# Patient Record
Sex: FEMALE | Race: WHITE | Employment: FULL TIME | URBAN - METROPOLITAN AREA
[De-identification: names, ages, dates, MRNs, and addresses within clinical notes are randomized per-mention and may not be internally consistent; named-entity substitution may affect disease eponyms.]

---

## 2017-07-18 ENCOUNTER — OFFICE VISIT (OUTPATIENT)
Dept: FAMILY MEDICINE | Facility: CLINIC | Age: 32
End: 2017-07-18
Payer: COMMERCIAL

## 2017-07-18 VITALS
SYSTOLIC BLOOD PRESSURE: 116 MMHG | HEART RATE: 81 BPM | RESPIRATION RATE: 16 BRPM | WEIGHT: 129.8 LBS | DIASTOLIC BLOOD PRESSURE: 78 MMHG | OXYGEN SATURATION: 98 % | TEMPERATURE: 98.5 F | BODY MASS INDEX: 22.28 KG/M2

## 2017-07-18 DIAGNOSIS — R68.84 JAW PAIN: Primary | ICD-10-CM

## 2017-07-18 PROCEDURE — 99213 OFFICE O/P EST LOW 20 MIN: CPT | Performed by: NURSE PRACTITIONER

## 2017-07-18 NOTE — MR AVS SNAPSHOT
After Visit Summary   7/18/2017    Mariola March    MRN: [id number]           Patient Information     Date Of Birth          1985        Visit Information        Provider Department      7/18/2017 10:00 AM Layla Richards APRN CNP [facility]        Today's Diagnoses     Jaw pain, left    -  1       Follow-ups after your visit        Your next 10 appointments already scheduled     Jul 27, 2017  8:00 AM CDT   [facility] Physical Adult with Iliana Arteaga MD   [facility] ([facility])    [address] [place] [id number] [phone]              Who to contact     If you have questions or need follow up information about today's clinic visit or your schedule please contact [facility] directly at [phone].  Normal or non-critical lab and imaging results will be communicated to you by MyChart, letter or phone within 4 business days after the clinic has received the results. If you do not hear from us within 7 days, please contact the clinic through Multigighart or phone. If you have a critical or abnormal lab result, we will notify you by phone as soon as possible.  Submit refill requests through [employer] or call your pharmacy and they will forward the refill request to us. Please allow 3 business days for your refill to be completed.          Additional Information About Your Visit        MyChart Information     [employer] gives you secure access to your electronic health record. If you see a primary care provider, you can also send messages to your care team and make appointments. If you have questions, please call your primary care clinic.  If you do not have a primary care provider, please call [phone] and they will assist you.        Care EveryWhere ID     This is your Care EveryWhere ID. This could be used by other organizations to access your [facility] medical records  [id number]         Your Vitals Were     Pulse Temperature Respirations Last Period Pulse Oximetry BMI (Body Mass Index)    81 98.5  F (36.9  C) (Oral) 16 2017 (Approximate) 98% 22.28 kg/m2       Blood Pressure from Last 3 Encounters:   17 116/78   16 120/80   16 109/74    Weight from Last 3 Encounters:   17 129 lb 12.8 oz (58.9 kg)   16 132 lb 9.6 oz (60.1 kg)   16 131 lb 8 oz (59.6 kg)              Today, you had the following     No orders found for display       Primary Care Provider    Physician No Ref-Primary       No address on file        Equal Access to Services     ARGENIS LAZAR : Shira Bettencourt, trish chi, jeff lowery, alex mendoza . So [facility] [phone].    ATENCIÓN: Si habla español, tiene a malin disposición servicios gratuitos de asistencia lingüística. Llame al [phone].    We comply with applicable federal civil rights laws and [place] laws. We do not discriminate on the basis of race, color, national origin, age, disability sex, sexual orientation or gender identity.            Thank you!     Thank you for choosing [facility]  for your care. Our goal is always to provide you with excellent care. Hearing back from our patients is one way we can continue to improve our services. Please take a few minutes to complete the written survey that you may receive in the mail after your visit with us. Thank you!             Your Updated Medication List - Protect others around you: Learn how to safely use, store and throw away your medicines at www.disposemymeds.org.          This list is accurate as of: 17  2:20 PM.  Always use your most recent med list.                   Brand Name Dispense Instructions for use Diagnosis    ibuprofen 600 MG tablet    ADVIL/MOTRIN    30 tablet    Take 1 tablet (600 mg) by mouth every 6 hours as needed for moderate pain     (normal spontaneous vaginal  delivery)

## 2017-07-18 NOTE — PROGRESS NOTES
"  SUBJECTIVE:                                                    Mariola March is a 32 year old female who presents to clinic today for the following health issues:      Patient's family has been sick with upper respiratory infections.    History of \"nasty tonsils and I should've had them removed years ago.\"  Patient c/o pain in her left ear, radiating down into her jaw x 3 days.  Patient has been running a fever.  Pain with jaw movement.  History of 2 teeth with root canals and <2 months ago on her left lower jaw but \"I don't think its a tooth issue.\" She does have an appointment with her dentist scheduled for tomorrow.    No fever.  She is taking ibuprofen 400-600 mg 3 times a day. Yesterday she took a total of 1600 mg of ibuprofen.  She is able to eat and drink, but with all chewing her left jaw hurts.  She did sleep with a heating pad on her jaw, it felt a little bit better at the time. When she opened her jaw to brush her teeth this morning, she started having left jaw pain again.  The pain is worse in the joint and it radiates forward on her jawline.      Problem list and histories reviewed & adjusted, as indicated.  Additional history: as documented    Patient Active Problem List   Diagnosis     Need for Tdap vaccination     ASCUS with positive high risk HPV cervical     Past Surgical History:   Procedure Laterality Date     APPENDECTOMY  1/7/07     CHOLECYSTECTOMY, LAPOROSCOPIC  1/7/07    Cholecystectomy, Laparoscopic     DENTAL SURGERY      wisdom teeth       Social History   Substance Use Topics     Smoking status: Never Smoker     Smokeless tobacco: Never Used     Alcohol use No     Family History   Problem Relation Age of Onset     Melanoma Mother      Hypertension Father      Lipids Maternal Grandmother          Current Outpatient Prescriptions   Medication Sig Dispense Refill     ibuprofen (ADVIL,MOTRIN) 600 MG tablet Take 1 tablet (600 mg) by mouth every 6 hours as needed for moderate pain 30 tablet 1 "     No Known Allergies  No lab results found.   BP Readings from Last 3 Encounters:   07/18/17 116/78   08/26/16 120/80   06/16/16 109/74    Wt Readings from Last 3 Encounters:   07/18/17 129 lb 12.8 oz (58.9 kg)   08/26/16 132 lb 9.6 oz (60.1 kg)   06/16/16 131 lb 8 oz (59.6 kg)                  Labs reviewed in EPIC    Reviewed and updated as needed this visit by clinical staff  Tobacco  Allergies  Meds  Med Hx  Surg Hx  Fam Hx  Soc Hx      Reviewed and updated as needed this visit by Provider         ROS:  Constitutional, HEENT, cardiovascular, pulmonary, GI, , musculoskeletal, neuro, skin, endocrine and psych systems are negative, except as otherwise noted.    OBJECTIVE:     /78 (BP Location: Left arm, Patient Position: Chair, Cuff Size: Adult Regular)  Pulse 81  Temp 98.5  F (36.9  C) (Oral)  Resp 16  Wt 129 lb 12.8 oz (58.9 kg)  LMP 07/06/2017 (Approximate)  SpO2 98%  BMI 22.28 kg/m2  Body mass index is 22.28 kg/(m^2).  EXAM:  Constitutional: healthy, alert, active and mild distress  Neck: Neck supple. No adenopathy.  ENT: Bilateral TM's are normal.  Posterior oropharynx is clear.  Nares clear without congestion.  Cardiovascular: S1, S2  Jaw: Easy/fluid Yimi movement of bilateral temporomandibular joint sites.  There is no swelling to her left temporomandibular joint.  No crepitus noted with movement.  Respiratory: Respirations easy and regular. No respiratory distress. Lungs sounds CTA.  Skin: warm and dry  Psychiatric: mentation appears normal. and affect normal/bright    ASSESSMENT/PLAN:     (R68.84) Jaw pain, left  (primary encounter diagnosis)  Comment: Acute  Plan: I reviewed with Mariola that her exam today is normal as far as no sign of an ear infection.  I do think she has an inflammation of the left temporomandibular joint.  I encouraged her to take ibuprofen 600 800 mg 3 times a day with food.  Soft diet, no chewy foods, no gum, no jerky etc.  She is to keep her scheduled  appointment with her dentist tomorrow. I would like his opinion on this.  In the event that he feels it would be helpful, I would approve and talked with Mariola today about it consultation with a temporomandibular joint specialist.  Other considerations for her pain today would include trigeminal neuralgia type issue, but she is not textbook for that today.  She will continue to do her self cares, she'll follow-up with her dentist, and she will let me know if she needs anything else for me. She was appreciative of the information today.      LORENZO [facility]

## 2017-07-18 NOTE — NURSING NOTE
"Chief Complaint   Patient presents with     Otalgia     x 3 days - left ear       Initial /78 (BP Location: Left arm, Patient Position: Chair, Cuff Size: Adult Regular)  Pulse 81  Temp 98.5  F (36.9  C) (Oral)  Resp 16  Wt 129 lb 12.8 oz (58.9 kg)  LMP 07/06/2017 (Approximate)  SpO2 98%  BMI 22.28 kg/m2 Estimated body mass index is 22.28 kg/(m^2) as calculated from the following:    Height as of 6/16/16: 5' 4\" (1.626 m).    Weight as of this encounter: 129 lb 12.8 oz (58.9 kg).  Medication Reconciliation: jelly [place], CLARISSE      "

## 2017-08-17 ENCOUNTER — RESULT FOLLOW UP (OUTPATIENT)
Dept: OBGYN | Facility: CLINIC | Age: 32
End: 2017-08-17

## 2017-08-17 ENCOUNTER — OFFICE VISIT (OUTPATIENT)
Dept: OBGYN | Facility: CLINIC | Age: 32
End: 2017-08-17
Payer: COMMERCIAL

## 2017-08-17 VITALS
TEMPERATURE: 97.6 F | SYSTOLIC BLOOD PRESSURE: 118 MMHG | WEIGHT: 135 LBS | DIASTOLIC BLOOD PRESSURE: 80 MMHG | HEIGHT: 64 IN | BODY MASS INDEX: 23.05 KG/M2 | HEART RATE: 75 BPM

## 2017-08-17 DIAGNOSIS — R87.610 ASCUS WITH POSITIVE HIGH RISK HPV CERVICAL: ICD-10-CM

## 2017-08-17 DIAGNOSIS — Z01.419 ENCOUNTER FOR GYNECOLOGICAL EXAMINATION WITHOUT ABNORMAL FINDING: Primary | ICD-10-CM

## 2017-08-17 DIAGNOSIS — R87.810 ASCUS WITH POSITIVE HIGH RISK HPV CERVICAL: ICD-10-CM

## 2017-08-17 PROCEDURE — 99395 PREV VISIT EST AGE 18-39: CPT | Performed by: OBSTETRICS & GYNECOLOGY

## 2017-08-17 PROCEDURE — 87624 HPV HI-RISK TYP POOLED RSLT: CPT | Performed by: OBSTETRICS & GYNECOLOGY

## 2017-08-17 PROCEDURE — 88175 CYTOPATH C/V AUTO FLUID REDO: CPT | Performed by: OBSTETRICS & GYNECOLOGY

## 2017-08-17 NOTE — LETTER
August 7, 2018      Mariola March  [address] [place] [address]    Dear ,      This letter is to remind you that you are due for your follow up PAP smear and HPV test on or about 08/17/18.    Please call [phone] to schedule your appointment at your earliest convenience.     If you have completed the tests outside of [place], please have the results forwarded to our office. We will update the chart for your primary Physician to review before your next annual physical.     Sincerely,      Lyndsey Leal MD/[facility]

## 2017-08-17 NOTE — NURSING NOTE
"Chief Complaint   Patient presents with     Physical     annual and pap       Initial /80  Pulse 75  Temp 97.6  F (36.4  C) (Oral)  Ht 5' 4\" (1.626 m)  Wt 135 lb (61.2 kg)  LMP 2017 (Exact Date)  BMI 23.17 kg/m2 Estimated body mass index is 23.17 kg/(m^2) as calculated from the following:    Height as of this encounter: 5' 4\" (1.626 m).    Weight as of this encounter: 135 lb (61.2 kg).  BP completed using cuff size: regular        The following HM Due: pap smear      The following patient reported/Care Every where data was sent to:  P ABSTRACT QUALITY INITIATIVES [[id number]]       patient has appointment for today  Angelica Leslie               "

## 2017-08-17 NOTE — MR AVS SNAPSHOT
"              After Visit Summary   8/17/2017    Mariola March    MRN: [id number]           Patient Information     Date Of Birth          1985        Visit Information        Provider Department      8/17/2017 4:15 PM Lyndsey Leal MD [facility]        Today's Diagnoses     Encounter for gynecological examination without abnormal finding    -  1    ASCUS with positive high risk HPV cervical           Follow-ups after your visit        Who to contact     If you have questions or need follow up information about today's clinic visit or your schedule please contact [facility] directly at [phone].  Normal or non-critical lab and imaging results will be communicated to you by [employer] Made Foodshart, letter or phone within 4 business days after the clinic has received the results. If you do not hear from us within 7 days, please contact the clinic through [employer] Food[employer] or phone. If you have a critical or abnormal lab result, we will notify you by phone as soon as possible.  Submit refill requests through [email or web address] or call your pharmacy and they will forward the refill request to us. Please allow 3 business days for your refill to be completed.          Additional Information About Your Visit        MyChart Information     [email or web address] gives you secure access to your electronic health record. If you see a primary care provider, you can also send messages to your care team and make appointments. If you have questions, please call your primary care clinic.  If you do not have a primary care provider, please call [phone] and they will assist you.        Care EveryWhere ID     This is your Care EveryWhere ID. This could be used by other organizations to access your [facility] medical records  [id number]        Your Vitals Were     Pulse Temperature Height Last Period BMI (Body Mass Index)       75 97.6  F (36.4  C) (Oral) 5' 4\" (1.626 m) 08/08/2017 (Exact Date) 23.17 kg/m2        Blood Pressure from Last " 3 Encounters:   17 118/80   17 116/78   16 120/80    Weight from Last 3 Encounters:   17 135 lb (61.2 kg)   17 129 lb 12.8 oz (58.9 kg)   16 132 lb 9.6 oz (60.1 kg)              We Performed the Following     HPV High Risk Types DNA Cervical     Pap imaged thin layer diagnostic with HPV (select HPV order below)        Primary Care Provider    Physician No Ref-Primary       No address on file        Equal Access to Services     ARGENIS LAZAR : Hadii kris iniguezo Soomaali, waaxda luqadaha, qaybta kaalmada sebastián, alex mendoza . So [facility] [phone].    ATENCIÓN: Si habla español, tiene a malin disposición servicios gratuitos de asistencia lingüística. Llame al [phone].    We comply with applicable federal civil rights laws and [place] laws. We do not discriminate on the basis of race, color, national origin, age, disability sex, sexual orientation or gender identity.            Thank you!     Thank you for choosing [facility]  for your care. Our goal is always to provide you with excellent care. Hearing back from our patients is one way we can continue to improve our services. Please take a few minutes to complete the written survey that you may receive in the mail after your visit with us. Thank you!             Your Updated Medication List - Protect others around you: Learn how to safely use, store and throw away your medicines at www.disposemymeds.org.          This list is accurate as of: 17  4:41 PM.  Always use your most recent med list.                   Brand Name Dispense Instructions for use Diagnosis    ibuprofen 600 MG tablet    ADVIL/MOTRIN    30 tablet    Take 1 tablet (600 mg) by mouth every 6 hours as needed for moderate pain     (normal spontaneous vaginal delivery)

## 2017-08-17 NOTE — PROGRESS NOTES
Mariola is a 32 year old  female who presents for annual exam.     Menses are regular q 28-30 days and normal lasting 6 days.  Menses flow: light and medium.  Patient's last menstrual period was 2017 (exact date).. Using condoms for contraception.  She is not currently considering pregnancy.  Besides routine health maintenance, she has no other health concerns today.  [place] are going to [place] this [place] with her inlaws.  GYNECOLOGIC HISTORY:  Menarche: [age]  Age at first intercourse: 22 Number of lifetime partners: <6  Mariola is sexually active with male partner(s) and is currently in monogamous relationship with .    History sexually transmitted infections:No STD history  STI testing offered?  Declined  WICHO exposure: No  History of abnormal Pap smear: YES - updated in Problem List and Health Maintenance accordingly  Family history of breast CA: Yes (Please explain): maternal grandmother  Family history of uterine/ovarian CA: No    Family history of colon CA: No    HEALTH MAINTENANCE:  Cholesterol: (No results found for: CHOL History of abnormal lipids: No  Mammo: NA . History of abnormal Mammo: Not applicable.  Regular Self Breast Exams: Yes  Calcium/Vitamin D intake: source:  dairy Adequate? Yes  TSH: (No results found for: TSH )  Pap; (  Lab Results   Component Value Date    PAP ASC-US 2016    PAP NIL 2015    )    HISTORY:  Obstetric History       T2      L2     SAB0   TAB0   Ectopic0   Multiple0   Live Births2       # Outcome Date GA Lbr Zhang/2nd Weight Sex Delivery Anes PTL Lv   2 Term 06/11/15 39w2d 04:36 / 00:08 7 lb 2.6 oz (3.25 kg) F Vag-Spont EPI N SAUL      Apgar1:  9                Apgar5: 9   1 Term 13 38w4d  7 lb 9 oz (3.43 kg) F  EPI N SAUL      Name: Mckinley        Past Medical History:   Diagnosis Date     ASCUS with positive high risk HPV cervical /16    16 ASCUS/+ HR HPV other.      High risk HPV infection 2015    not 16 or 18     History of  colposcopy with cervical biopsy 6/16/16    Negative     Past Surgical History:   Procedure Laterality Date     APPENDECTOMY  1/7/07     CHOLECYSTECTOMY, LAPOROSCOPIC  1/7/07    Cholecystectomy, Laparoscopic     DENTAL SURGERY      wisdom teeth     Family History   Problem Relation Age of Onset     Melanoma Mother      Hypertension Father      Lipids Maternal Grandmother      Social History     Social History     Marital status:      Spouse name: N/A     Number of children: 1     Years of education: N/A     Occupational History     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 [employer]     Social History Main Topics     Smoking status: Never Smoker     Smokeless tobacco: Never Used     Alcohol use No     Drug use: No     Sexual activity: Yes     Partners: Male     Birth control/ protection: Condom, None      Comment: sometimes condoms, possibly considering pregnancy     Other Topics Concern     Not on file     Social History Narrative    Caffeine intake/servings daily - 1    Calcium intake/servings daily - 3    Exercise 3 times weekly - describe walks    Sunscreen used - Yes    Seatbelts used - Yes    Guns stored in the home - No    Self Breast Exam - Yes    Pap test up to date -  No    Eye exam up to date -  Yes    Dental exam up to date -  Yes    DEXA scan up to date -  No    Flex Sig/Colonoscopy up to date -  No    Mammography up to date -  No    Immunizations reviewed and up to date - Yes    Abuse: Current or Past (Physical, Sexual or Emotional) - No    Do you feel safe in your environment - Yes    Do you cope well with stress - Yes    Do you suffer from insomnia - No    Last updated by: Amber Jones  12/9/2014               Current Outpatient Prescriptions:      ibuprofen (ADVIL,MOTRIN) 600 MG tablet, Take 1 tablet (600 mg) by mouth every 6 hours as needed for moderate pain, Disp: 30 tablet, Rfl: 1  No current facility-administered medications for this visit.     Facility-Administered  "Medications Ordered in Other Visits:      lidocaine-EPINEPHrine 1.5 %-1:[id number] injection, , EPIDURAL, PRN, Christel Hernandez MD, 3 mL at 06/10/15 2208     bupivacaine (MARCAINE) 0.125 % injection (diluted from stock concentration by MD or CRNA), , EPIDURAL, PRN, Christel Hernandez MD, 5 mL at 06/10/15 2212   No Known Allergies    Past medical, surgical, social and family history were reviewed and updated in EPIC.    ROS:   C:     NEGATIVE for fever, chills, change in weight  I:       NEGATIVE for worrisome rashes, moles or lesions  E:     NEGATIVE for vision changes or irritation  E/M: NEGATIVE for ear, mouth and throat problems  R:     NEGATIVE for significant cough or SOB  CV:   NEGATIVE for chest pain, palpitations or peripheral edema  GI:     NEGATIVE for nausea, abdominal pain, heartburn, or change in bowel habits  :   NEGATIVE for frequency, dysuria, hematuria, vaginal discharge, or irregular bleeding  M:     NEGATIVE for significant arthralgias or myalgia  N:      NEGATIVE for weakness, dizziness or paresthesias  E:      NEGATIVE for temperature intolerance, skin/hair changes  P:      NEGATIVE for changes in mood or affect.    EXAM:  /80  Pulse 75  Temp 97.6  F (36.4  C) (Oral)  Ht 5' 4\" (1.626 m)  Wt 135 lb (61.2 kg)  LMP 08/08/2017 (Exact Date)  BMI 23.17 kg/m2   BMI: Body mass index is 23.17 kg/(m^2).  Constitutional: healthy, alert and no distress  Head: Normocephalic. No masses, lesions, tenderness or abnormalities  Neck: Neck supple. Trachea midline. No adenopathy. Thyroid symmetric, normal size.   Cardiovascular: RRR.   Respiratory: Negative.   Breast: No nodularity, asymmetry or nipple discharge bilaterally.  Gastrointestinal: Abdomen soft, non-tender, non-distended. No masses, organomegaly.  :  Vulva:  No external lesions, normal female hair distribution, no inguinal adenopathy.    Urethra:  Midline, non-tender, well supported, no discharge  Vagina:  Moist, pink, no abnormal discharge, " no lesions  Uterus:  Normal size, non-tender, freely mobile  Ovaries:  No masses appreciated, non-tender, mobile  Rectal Exam: deferred  Musculoskeletal: extremities normal  Skin: no suspicious lesions or rashes  Psychiatric: Affect appropriate, cooperative,mentation appears normal.     COUNSELING:   Reviewed preventive health counseling, as reflected in patient instructions  Special attention given to:        Regular exercise       Healthy diet/nutrition       Contraception       Family planning       Osteoporosis Prevention/Bone Health       Safe sex practices/STD prevention   reports that she has never smoked. She has never used smokeless tobacco.    Body mass index is 23.17 kg/(m^2).      FRAX Risk Assessment    ASSESSMENT:  32 year old female with satisfactory annual exam  Plans: dx pap done, if normal, repeat 12 months.  If abnormal, colp.  Labs done through work biometrics yesterday.  RTC yearly or prn.    JEREMÍAS ALFRED MD

## 2017-08-23 LAB
COPATH REPORT: NORMAL
PAP: NORMAL

## 2017-08-25 LAB
FINAL DIAGNOSIS: NORMAL
HPV HR 12 DNA CVX QL NAA+PROBE: NEGATIVE
HPV16 DNA SPEC QL NAA+PROBE: NEGATIVE
HPV18 DNA SPEC QL NAA+PROBE: NEGATIVE
SPECIMEN DESCRIPTION: NORMAL

## 2017-08-29 NOTE — PROGRESS NOTES
1/5/15 Pap NIL +HR HPV (not 16/18).   5/6/16 ASCUS/+ HR HPV other. Plan: Colposcopy.   6/16/16: [place] Bx & ECC - negative. Plan cotest in 1 year.   6/5/17 Cotest reminder letter sent ([facility])  08/17/17: NIL pap, Neg HR HPV result. Plan pap in 1 year per provider. Friend Trusted message sent to the pt with the results and recommendations.  08/07/18 Pap reminder letter sent. ([facility])  01/21/19 Spoke with pt, states she will call to schedule. ([facility])  03/13/19 Patient is lost to pap tracking follow-up. FYI routed to provider. ([facility])

## 2017-10-30 ENCOUNTER — OFFICE VISIT (OUTPATIENT)
Dept: URGENT CARE | Facility: URGENT CARE | Age: 32
End: 2017-10-30
Payer: COMMERCIAL

## 2017-10-30 VITALS
OXYGEN SATURATION: 98 % | SYSTOLIC BLOOD PRESSURE: 105 MMHG | TEMPERATURE: 100 F | WEIGHT: 132 LBS | BODY MASS INDEX: 22.66 KG/M2 | HEART RATE: 129 BPM | DIASTOLIC BLOOD PRESSURE: 67 MMHG

## 2017-10-30 DIAGNOSIS — B34.9 VIRAL ILLNESS: Primary | ICD-10-CM

## 2017-10-30 DIAGNOSIS — R07.0 THROAT PAIN: ICD-10-CM

## 2017-10-30 LAB
DEPRECATED S PYO AG THROAT QL EIA: NORMAL
FLUAV+FLUBV AG SPEC QL: NEGATIVE
FLUAV+FLUBV AG SPEC QL: NEGATIVE
SPECIMEN SOURCE: NORMAL
SPECIMEN SOURCE: NORMAL

## 2017-10-30 PROCEDURE — 87081 CULTURE SCREEN ONLY: CPT | Performed by: FAMILY MEDICINE

## 2017-10-30 PROCEDURE — 99213 OFFICE O/P EST LOW 20 MIN: CPT | Performed by: FAMILY MEDICINE

## 2017-10-30 PROCEDURE — 87804 INFLUENZA ASSAY W/OPTIC: CPT | Performed by: FAMILY MEDICINE

## 2017-10-30 PROCEDURE — 87880 STREP A ASSAY W/OPTIC: CPT | Performed by: FAMILY MEDICINE

## 2017-10-30 NOTE — MR AVS SNAPSHOT
After Visit Summary   10/30/2017    Mariola March    MRN: [id number]           Patient Information     Date Of Birth          1985        Visit Information        Provider Department      10/30/2017 5:00 PM Avis Chen MD [facility]        Today's Diagnoses     Throat pain    -  1      Care Instructions      Viral Upper Respiratory Illness (Adult)  You have a viral upper respiratory illness (URI), which is another term for the common cold. This illness is contagious during the first few days. It is spread through the air by coughing and sneezing. It may also be spread by direct contact (touching the sick person and then touching your own eyes, nose, or mouth). Frequent handwashing will decrease risk of spread. Most viral illnesses go away within 7 to 10 days with rest and simple home remedies. Sometimes the illness may last for several weeks. Antibiotics will not kill a virus, and they are generally not prescribed for this condition.    Home care    If symptoms are severe, rest at home for the first 2 to 3 days. When you resume activity, don't let yourself get too tired.    Avoid being exposed to cigarette smoke (yours or others ).    You may use acetaminophen or ibuprofen to control pain and fever, unless another medicine was prescribed. (Note: If you have chronic liver or kidney disease, have ever had a stomach ulcer or gastrointestinal bleeding, or are taking blood-thinning medicines, talk with your healthcare provider before using these medicines.) Aspirin should never be given to anyone under 18 years of age who is ill with a viral infection or fever. It may cause severe liver or brain damage.    Your appetite may be poor, so a light diet is fine. Avoid dehydration by drinking 6 to 8 glasses of fluids per day (water, soft drinks, juices, tea, or soup). Extra fluids will help loosen secretions in the nose and lungs.    Over-the-counter cold medicines  will not shorten the length of time you re sick, but they may be helpful for the following symptoms: cough, sore throat, and nasal and sinus congestion. (Note: Do not use decongestants if you have high blood pressure.)  Follow-up care  Follow up with your healthcare provider, or as advised.  When to seek medical advice  Call your healthcare provider right away if any of these occur:    Cough with lots of colored sputum (mucus)    Severe headache; face, neck, or ear pain    Difficulty swallowing due to throat pain    Fever of 100.4 F (38 C)  Call 911, or get immediate medical care  Call emergency services right away if any of these occur:    Chest pain, shortness of breath, wheezing, or difficulty breathing    Coughing up blood    Inability to swallow due to throat pain  Date Last Reviewed: 9/13/2015 2000-2017 [email or web address]. [address] [place] [address]. All rights reserved. This information is not intended as a substitute for professional medical care. Always follow your healthcare professional's instructions.                Follow-ups after your visit        Follow-up notes from your care team     Return if symptoms worsen or fail to improve.      Who to contact     If you have questions or need follow up information about today's clinic visit or your schedule please contact [facility] directly at [phone].  Normal or non-critical lab and imaging results will be communicated to you by [employer], letter or phone within 4 business days after the clinic has received the results. If you do not hear from us within 7 days, please contact the clinic through [employer] or phone. If you have a critical or abnormal lab result, we will notify you by phone as soon as possible.  Submit refill requests through [email or web address] or call your pharmacy and they will forward the refill request to us. Please allow 3 business days for your refill to be completed.          Additional Information  About Your Visit        [employer] Information     [employer] gives you secure access to your electronic health record. If you see a primary care provider, you can also send messages to your care team and make appointments. If you have questions, please call your primary care clinic.  If you do not have a primary care provider, please call [phone] and they will assist you.        Care EveryWhere ID     This is your Care EveryWhere ID. This could be used by other organizations to access your [place] medical records  [id number]        Your Vitals Were     Pulse Temperature Pulse Oximetry BMI (Body Mass Index)          129 100  F (37.8  C) (Oral) 98% 22.66 kg/m2         Blood Pressure from Last 3 Encounters:   10/30/17 105/67   08/17/17 118/80   07/18/17 116/78    Weight from Last 3 Encounters:   10/30/17 132 lb (59.9 kg)   08/17/17 135 lb (61.2 kg)   07/18/17 129 lb 12.8 oz (58.9 kg)              We Performed the Following     Beta strep group A culture     Influenza A/B antigen     Strep, Rapid Screen        Primary Care Provider    Physician No Ref-Primary       NO REF-PRIMARY PHYSICIAN        Equal Access to Services     ARGENIS LAZAR : Hadii kris mak Soitalo, waaxda lumichelle, qaybta kaalmada sebastián, alex mendoza . So [facility] [phone].    ATENCIÓN: Si habla español, tiene a malin disposición servicios gratuitos de asistencia lingüística. Llame al [phone].    We comply with applicable federal civil rights laws and [place] laws. We do not discriminate on the basis of race, color, national origin, age, disability, sex, sexual orientation, or gender identity.            Thank you!     Thank you for choosing [facility]  for your care. Our goal is always to provide you with excellent care. Hearing back from our patients is one way we can continue to improve our services. Please take a few minutes to complete the written survey that you may receive in the  mail after your visit with us. Thank you!             Your Updated Medication List - Protect others around you: Learn how to safely use, store and throw away your medicines at www.disposemymeds.org.          This list is accurate as of: 10/30/17  5:52 PM.  Always use your most recent med list.                   Brand Name Dispense Instructions for use Diagnosis    dextromethorphan 15 MG/5ML syrup      Take 10 mLs by mouth 4 times daily as needed for cough        ibuprofen 600 MG tablet    ADVIL/MOTRIN    30 tablet    Take 1 tablet (600 mg) by mouth every 6 hours as needed for moderate pain     (normal spontaneous vaginal delivery)

## 2017-10-30 NOTE — NURSING NOTE
"Chief Complaint   Patient presents with     Urgent Care     Pt in clinic to have eval for sore throat, fever, aches, and back pain.     Fever     Generalized Body Aches     Back Pain     Pharyngitis       Initial /67  Pulse 129  Temp 100  F (37.8  C) (Oral)  Wt 132 lb (59.9 kg)  SpO2 98%  BMI 22.66 kg/m2 Estimated body mass index is 22.66 kg/(m^2) as calculated from the following:    Height as of 8/17/17: 5' 4\" (1.626 m).    Weight as of this encounter: 132 lb (59.9 kg).  Medication Reconciliation: complete   Kate Gaines/ MA    "

## 2017-10-30 NOTE — PATIENT INSTRUCTIONS

## 2017-10-30 NOTE — PROGRESS NOTES
SUBJECTIVE:   Mariola March is a 32 year old female who present complaining of flu-like symptoms: fevers, chills, myalgias and sore throat for 1 day. Denies dyspnea or wheezing.  Her daughter has similar symptoms.  No flu shot.      OBJECTIVE:  /67  Pulse 129  Temp 100  F (37.8  C) (Oral)  Wt 132 lb (59.9 kg)  SpO2 98%  BMI 22.66 kg/m2   Appears mildly flushed and ill but not toxic; temperature as noted in vitals. Ears normal. Throat and pharynx erythematous, no exudates.  Neck supple. Mild anterior adenopathy in the neck. Sinuses non tender. The chest is clear.  Back: no CVA T.     Labs:    Results for orders placed or performed in visit on 10/30/17   Influenza A/B antigen   Result Value Ref Range    Influenza A/B Agn Specimen Nasal     Influenza A Negative NEG^Negative    Influenza B Negative NEG^Negative   Strep, Rapid Screen   Result Value Ref Range    Specimen Description Throat     Rapid Strep A Screen       NEGATIVE: No Group A streptococcal antigen detected by immunoassay, await culture report.        ASSESSMENT:  Viral illness    PLAN:  As per orders. Tylenol or Ibuprofen prn.  Symptomatic therapy suggested: rest, increase fluids, gargle prn for sore throat and call prn if symptoms persist or worsen. Call or return to clinic prn if these symptoms worsen or fail to improve as anticipated.  Avis Vicente MD

## 2017-10-31 LAB
BACTERIA SPEC CULT: NORMAL
SPECIMEN SOURCE: NORMAL

## 2018-09-25 ENCOUNTER — HEALTH MAINTENANCE LETTER (OUTPATIENT)
Age: 33
End: 2018-09-25

## 2019-01-21 ENCOUNTER — TELEPHONE (OUTPATIENT)
Dept: OBGYN | Facility: CLINIC | Age: 34
End: 2019-01-21

## 2019-01-21 DIAGNOSIS — R87.610 ASCUS WITH POSITIVE HIGH RISK HPV CERVICAL: ICD-10-CM

## 2019-01-21 DIAGNOSIS — R87.810 ASCUS WITH POSITIVE HIGH RISK HPV CERVICAL: ICD-10-CM

## 2019-01-21 NOTE — TELEPHONE ENCOUNTER
Pt is past due for f/u pap smear.  Spoke with pt, states she will call to schedule.  aKte Vásquez,    Pap Tracking

## 2020-03-02 ENCOUNTER — HEALTH MAINTENANCE LETTER (OUTPATIENT)
Age: 35
End: 2020-03-02

## 2020-12-20 ENCOUNTER — HEALTH MAINTENANCE LETTER (OUTPATIENT)
Age: 35
End: 2020-12-20

## 2021-04-24 ENCOUNTER — HEALTH MAINTENANCE LETTER (OUTPATIENT)
Age: 36
End: 2021-04-24

## 2021-10-03 ENCOUNTER — HEALTH MAINTENANCE LETTER (OUTPATIENT)
Age: 36
End: 2021-10-03

## 2022-05-15 ENCOUNTER — HEALTH MAINTENANCE LETTER (OUTPATIENT)
Age: 37
End: 2022-05-15

## 2022-09-10 ENCOUNTER — HEALTH MAINTENANCE LETTER (OUTPATIENT)
Age: 37
End: 2022-09-10

## 2023-06-03 ENCOUNTER — HEALTH MAINTENANCE LETTER (OUTPATIENT)
Age: 38
End: 2023-06-03